# Patient Record
Sex: MALE | Race: OTHER | HISPANIC OR LATINO | URBAN - METROPOLITAN AREA
[De-identification: names, ages, dates, MRNs, and addresses within clinical notes are randomized per-mention and may not be internally consistent; named-entity substitution may affect disease eponyms.]

---

## 2017-11-04 ENCOUNTER — EMERGENCY (EMERGENCY)
Facility: HOSPITAL | Age: 22
LOS: 1 days | Discharge: HOME | End: 2017-11-04
Attending: EMERGENCY MEDICINE | Admitting: EMERGENCY MEDICINE
Payer: MEDICAID

## 2017-11-04 VITALS
HEART RATE: 86 BPM | OXYGEN SATURATION: 98 % | WEIGHT: 139.99 LBS | DIASTOLIC BLOOD PRESSURE: 80 MMHG | HEIGHT: 72 IN | RESPIRATION RATE: 20 BRPM | TEMPERATURE: 99 F | SYSTOLIC BLOOD PRESSURE: 138 MMHG

## 2017-11-04 DIAGNOSIS — R22.0 LOCALIZED SWELLING, MASS AND LUMP, HEAD: Chronic | ICD-10-CM

## 2017-11-04 PROCEDURE — 94640 AIRWAY INHALATION TREATMENT: CPT

## 2017-11-04 PROCEDURE — 99284 EMERGENCY DEPT VISIT MOD MDM: CPT | Mod: 25

## 2017-11-04 PROCEDURE — 71046 X-RAY EXAM CHEST 2 VIEWS: CPT

## 2017-11-04 PROCEDURE — 96374 THER/PROPH/DIAG INJ IV PUSH: CPT

## 2017-11-04 PROCEDURE — 99285 EMERGENCY DEPT VISIT HI MDM: CPT | Mod: 25

## 2017-11-04 PROCEDURE — 99053 MED SERV 10PM-8AM 24 HR FAC: CPT

## 2017-11-04 PROCEDURE — 71020: CPT | Mod: 26

## 2017-11-04 RX ORDER — BUDESONIDE AND FORMOTEROL FUMARATE DIHYDRATE 160; 4.5 UG/1; UG/1
2 AEROSOL RESPIRATORY (INHALATION)
Qty: 1 | Refills: 0 | OUTPATIENT
Start: 2017-11-04 | End: 2017-12-04

## 2017-11-04 RX ORDER — IPRATROPIUM/ALBUTEROL SULFATE 18-103MCG
3 AEROSOL WITH ADAPTER (GRAM) INHALATION ONCE
Qty: 0 | Refills: 0 | Status: COMPLETED | OUTPATIENT
Start: 2017-11-04 | End: 2017-11-04

## 2017-11-04 RX ORDER — ALBUTEROL 90 UG/1
2 AEROSOL, METERED ORAL
Qty: 1 | Refills: 0 | OUTPATIENT
Start: 2017-11-04 | End: 2017-12-04

## 2017-11-04 RX ADMIN — Medication 125 MILLIGRAM(S): at 04:18

## 2017-11-04 RX ADMIN — Medication 3 MILLILITER(S): at 04:05

## 2017-11-04 NOTE — ED PROVIDER NOTE - PROGRESS NOTE DETAILS
PEAK PRIOR TO TX: 125 PEAK AFTER TX: 375. cxr reviewed. pt improved. lungs CTABL. will rx prednisone, Symbicort, and albuterol inhaler. All questions answered and concerns addressed. pt verbalized understanding and agreement with plan and dx. pt advised to follow up with PMD. pt advised to return to ed for worsening symptoms including fever, cp, sob. will dc.

## 2017-11-04 NOTE — ED PROVIDER NOTE - OBJECTIVE STATEMENT
21 y/o M pt with PMHx of asthma presents to ED c/o difficulty breathing, nasal and chest congestion x1 hour. Pt reports he believes he is having a sudden onset asthma attack. He did not self medicate PTA because he does not have an inhaler. Pt notes that he was intubated for asthma 6 years ago. Pt denies fever, chills, CP, cough, sore throat, ear pain, headache, nausea, vomiting, and abdominal pain. No further complaints at this time. 23 y/o M pt with PMHx of asthma presents to ED c/o difficulty breathing, nasal and chest congestion x1 hour and productive cough x3 days. Pt reports he believes he is having a sudden onset asthma attack. He did not self medicate PTA because he does not have an inhaler. Pt notes that he was intubated for asthma 6 years ago. Pt denies fever, chills, CP, sore throat, ear pain, headache, nausea, vomiting, and abdominal pain. No further complaints at this time. 23 y/o M pt with PMHx of asthma (1 intubation 6 y ago) presents to ED c/o asthma exacerbation x tonight. pt endorses difficulty breathing, nasal and chest congestion x1 hour with productive cough x3 days. pt reports he ran out of his inhaler.  Pt denies rash, fever, chills, CP, sore throat, ear pain, headache, nausea, vomiting, and abdominal pain. nkda

## 2017-11-04 NOTE — ED PROVIDER NOTE - CHPI ED SYMPTOMS POS
NASAL CONGESTION/DIFFICULTY BREATHING/CHEST CONGESTION NASAL CONGESTION/CHEST CONGESTION/COUGH/DIFFICULTY BREATHING WHEEZING/DIFFICULTY BREATHING/NASAL CONGESTION/CHEST CONGESTION/COUGH

## 2017-11-04 NOTE — ED PROVIDER NOTE - ATTENDING CONTRIBUTION TO CARE
I personally saw the patient with the PA, and completed the key components of the history and physical exam. I then discussed the management plan with the PA    pt with wheezing, sob. will give nebs and steroids, dx: reactive airway disease

## 2017-11-04 NOTE — ED ADULT NURSE NOTE - CHPI ED SYMPTOMS NEG
no fever/no edema/no chest pain/no cough/no chills/no body aches/no headache/no hemoptysis/no diaphoresis

## 2017-11-04 NOTE — ED ADULT NURSE NOTE - OBJECTIVE STATEMENT
Pt. complaining of sob and difficulty breathing x 2 hours.  Pt. states he has a history of asthma and approx 2 hours ago he began having difficulty breathing; pt. states he has no inhalers at home.  Pt. denies chest pain.  Denies fever/chills.  Denies n/v/d.

## 2017-11-27 ENCOUNTER — EMERGENCY (EMERGENCY)
Facility: HOSPITAL | Age: 22
LOS: 1 days | Discharge: HOME | End: 2017-11-27
Attending: EMERGENCY MEDICINE
Payer: COMMERCIAL

## 2017-11-27 VITALS
DIASTOLIC BLOOD PRESSURE: 64 MMHG | TEMPERATURE: 100 F | RESPIRATION RATE: 18 BRPM | SYSTOLIC BLOOD PRESSURE: 112 MMHG | OXYGEN SATURATION: 97 % | HEART RATE: 90 BPM

## 2017-11-27 VITALS
HEIGHT: 76 IN | DIASTOLIC BLOOD PRESSURE: 86 MMHG | OXYGEN SATURATION: 98 % | RESPIRATION RATE: 18 BRPM | TEMPERATURE: 102 F | SYSTOLIC BLOOD PRESSURE: 119 MMHG | WEIGHT: 134.92 LBS | HEART RATE: 113 BPM

## 2017-11-27 DIAGNOSIS — R13.10 DYSPHAGIA, UNSPECIFIED: ICD-10-CM

## 2017-11-27 DIAGNOSIS — J36 PERITONSILLAR ABSCESS: ICD-10-CM

## 2017-11-27 DIAGNOSIS — Z79.899 OTHER LONG TERM (CURRENT) DRUG THERAPY: ICD-10-CM

## 2017-11-27 DIAGNOSIS — R22.0 LOCALIZED SWELLING, MASS AND LUMP, HEAD: Chronic | ICD-10-CM

## 2017-11-27 DIAGNOSIS — Z98.890 OTHER SPECIFIED POSTPROCEDURAL STATES: ICD-10-CM

## 2017-11-27 DIAGNOSIS — Z79.52 LONG TERM (CURRENT) USE OF SYSTEMIC STEROIDS: ICD-10-CM

## 2017-11-27 DIAGNOSIS — J45.909 UNSPECIFIED ASTHMA, UNCOMPLICATED: ICD-10-CM

## 2017-11-27 LAB
ALBUMIN SERPL ELPH-MCNC: 4.2 G/DL — SIGNIFICANT CHANGE UP (ref 3.3–5.2)
ALP SERPL-CCNC: 68 U/L — SIGNIFICANT CHANGE UP (ref 40–120)
ALT FLD-CCNC: 8 U/L — SIGNIFICANT CHANGE UP
ANION GAP SERPL CALC-SCNC: 16 MMOL/L — SIGNIFICANT CHANGE UP (ref 5–17)
AST SERPL-CCNC: 14 U/L — SIGNIFICANT CHANGE UP
BASOPHILS # BLD AUTO: 0.1 K/UL — SIGNIFICANT CHANGE UP (ref 0–0.2)
BILIRUB SERPL-MCNC: 0.9 MG/DL — SIGNIFICANT CHANGE UP (ref 0.4–2)
BUN SERPL-MCNC: 11 MG/DL — SIGNIFICANT CHANGE UP (ref 8–20)
CALCIUM SERPL-MCNC: 9.7 MG/DL — SIGNIFICANT CHANGE UP (ref 8.6–10.2)
CHLORIDE SERPL-SCNC: 99 MMOL/L — SIGNIFICANT CHANGE UP (ref 98–107)
CO2 SERPL-SCNC: 25 MMOL/L — SIGNIFICANT CHANGE UP (ref 22–29)
CREAT SERPL-MCNC: 0.92 MG/DL — SIGNIFICANT CHANGE UP (ref 0.5–1.3)
EOSINOPHIL # BLD AUTO: 0.1 K/UL — SIGNIFICANT CHANGE UP (ref 0–0.5)
GLUCOSE SERPL-MCNC: 93 MG/DL — SIGNIFICANT CHANGE UP (ref 70–115)
HCT VFR BLD CALC: 43.8 % — SIGNIFICANT CHANGE UP (ref 42–52)
HGB BLD-MCNC: 14.3 G/DL — SIGNIFICANT CHANGE UP (ref 14–18)
LYMPHOCYTES # BLD AUTO: 1.6 K/UL — SIGNIFICANT CHANGE UP (ref 1–4.8)
LYMPHOCYTES # BLD AUTO: 9 % — LOW (ref 20–55)
MCHC RBC-ENTMCNC: 28.4 PG — SIGNIFICANT CHANGE UP (ref 27–31)
MCHC RBC-ENTMCNC: 32.6 G/DL — SIGNIFICANT CHANGE UP (ref 32–36)
MCV RBC AUTO: 86.9 FL — SIGNIFICANT CHANGE UP (ref 80–94)
MONOCYTES # BLD AUTO: 1.4 K/UL — HIGH (ref 0–0.8)
MONOCYTES NFR BLD AUTO: 8 % — SIGNIFICANT CHANGE UP (ref 3–10)
NEUTROPHILS # BLD AUTO: 13.6 K/UL — HIGH (ref 1.8–8)
NEUTROPHILS NFR BLD AUTO: 81 % — HIGH (ref 37–73)
NEUTS BAND # BLD: 2 % — SIGNIFICANT CHANGE UP (ref 0–8)
PLAT MORPH BLD: NORMAL — SIGNIFICANT CHANGE UP
PLATELET # BLD AUTO: 288 K/UL — SIGNIFICANT CHANGE UP (ref 150–400)
POTASSIUM SERPL-MCNC: 3.8 MMOL/L — SIGNIFICANT CHANGE UP (ref 3.5–5.3)
POTASSIUM SERPL-SCNC: 3.8 MMOL/L — SIGNIFICANT CHANGE UP (ref 3.5–5.3)
PROT SERPL-MCNC: 8.2 G/DL — SIGNIFICANT CHANGE UP (ref 6.6–8.7)
RBC # BLD: 5.04 M/UL — SIGNIFICANT CHANGE UP (ref 4.6–6.2)
RBC # FLD: 12.2 % — SIGNIFICANT CHANGE UP (ref 11–15.6)
RBC BLD AUTO: SIGNIFICANT CHANGE UP
SODIUM SERPL-SCNC: 140 MMOL/L — SIGNIFICANT CHANGE UP (ref 135–145)
WBC # BLD: 16.7 K/UL — HIGH (ref 4.8–10.8)
WBC # FLD AUTO: 16.7 K/UL — HIGH (ref 4.8–10.8)

## 2017-11-27 PROCEDURE — 99284 EMERGENCY DEPT VISIT MOD MDM: CPT

## 2017-11-27 PROCEDURE — 42700 I&D ABSCESS PERITONSILLAR: CPT

## 2017-11-27 PROCEDURE — 99284 EMERGENCY DEPT VISIT MOD MDM: CPT | Mod: 25

## 2017-11-27 PROCEDURE — 80053 COMPREHEN METABOLIC PANEL: CPT

## 2017-11-27 PROCEDURE — T1013: CPT

## 2017-11-27 PROCEDURE — 96374 THER/PROPH/DIAG INJ IV PUSH: CPT | Mod: XU

## 2017-11-27 PROCEDURE — 36415 COLL VENOUS BLD VENIPUNCTURE: CPT

## 2017-11-27 PROCEDURE — 96375 TX/PRO/DX INJ NEW DRUG ADDON: CPT | Mod: XU

## 2017-11-27 PROCEDURE — 85027 COMPLETE CBC AUTOMATED: CPT

## 2017-11-27 RX ORDER — SODIUM CHLORIDE 9 MG/ML
2000 INJECTION INTRAMUSCULAR; INTRAVENOUS; SUBCUTANEOUS ONCE
Qty: 0 | Refills: 0 | Status: COMPLETED | OUTPATIENT
Start: 2017-11-27 | End: 2017-11-27

## 2017-11-27 RX ORDER — DEXAMETHASONE 0.5 MG/5ML
10 ELIXIR ORAL ONCE
Qty: 0 | Refills: 0 | Status: COMPLETED | OUTPATIENT
Start: 2017-11-27 | End: 2017-11-27

## 2017-11-27 RX ORDER — ACETAMINOPHEN 500 MG
650 TABLET ORAL ONCE
Qty: 0 | Refills: 0 | Status: COMPLETED | OUTPATIENT
Start: 2017-11-27 | End: 2017-11-27

## 2017-11-27 RX ORDER — PIPERACILLIN AND TAZOBACTAM 4; .5 G/20ML; G/20ML
3.38 INJECTION, POWDER, LYOPHILIZED, FOR SOLUTION INTRAVENOUS ONCE
Qty: 0 | Refills: 0 | Status: COMPLETED | OUTPATIENT
Start: 2017-11-27 | End: 2017-11-27

## 2017-11-27 RX ADMIN — SODIUM CHLORIDE 2000 MILLILITER(S): 9 INJECTION INTRAMUSCULAR; INTRAVENOUS; SUBCUTANEOUS at 12:57

## 2017-11-27 RX ADMIN — PIPERACILLIN AND TAZOBACTAM 200 GRAM(S): 4; .5 INJECTION, POWDER, LYOPHILIZED, FOR SOLUTION INTRAVENOUS at 12:56

## 2017-11-27 RX ADMIN — Medication 650 MILLIGRAM(S): at 12:55

## 2017-11-27 RX ADMIN — Medication 10 MILLIGRAM(S): at 12:56

## 2017-11-27 NOTE — ED PROVIDER NOTE - CHPI ED SYMPTOMS NEG
no headache/no vomiting/no rash/no decreased eating/drinking/no cough/no abdominal pain/no shortness of breath/no diarrhea

## 2017-11-27 NOTE — ED ADULT NURSE REASSESSMENT NOTE - NS ED NURSE REASSESS COMMENT FT1
pt with no complaints, AOX3, even and unlabored resps, awaiting ENT for consult. VSS, afebrile at this time, appears more comfortable, verbalized comfort at this time.

## 2017-11-27 NOTE — ED PROVIDER NOTE - OBJECTIVE STATEMENT
The patient presents with 2 days history of sore throat with difficult swallowing. No trismus, No torticollis +fever, +chill, No CP, No SOB The patient presents with 2 days history of sore throat with difficult swallowing. No trismus, No torticollis +fever, +chill, No CP, No SOB, No abd pain

## 2017-11-27 NOTE — ED PROVIDER NOTE - PROGRESS NOTE DETAILS
ENT On-call physician states that he will come at 7 pm to evaluate the patient. Case s/o to Dr Villanueva at 7 PM. Seen in ED by ENT/Dr. Jay and I and D performed and pt cleared for d/c.  Rx warm, salt water gargles, Augmentin and Medrol

## 2017-11-27 NOTE — ED ADULT NURSE REASSESSMENT NOTE - NS ED NURSE REASSESS COMMENT FT1
ENT here to I + D patients abscess. pt remains AOX3, in no apparent distress. even and unlabored resps.

## 2017-11-27 NOTE — ED PROVIDER NOTE - ENMT, MLM
Airway patent, Nasal mucosa clear. Mouth with normal mucosa. Throat has L tonsillar enlargement with fullness and fluctuant area palpable

## 2017-11-27 NOTE — CONSULT NOTE ADULT - SUBJECTIVE AND OBJECTIVE BOX
22y M 1 day history of left sore throat. Dysphagia. Left Otalgia.    PE: gen NAD  OC: Left peritonsillar fullness/edema. deviation of uvula. tonsillitis.  Neck supple, minimal LAD

## 2018-07-29 ENCOUNTER — EMERGENCY (EMERGENCY)
Facility: HOSPITAL | Age: 23
LOS: 1 days | Discharge: HOME | End: 2018-07-29
Attending: EMERGENCY MEDICINE
Payer: COMMERCIAL

## 2018-07-29 VITALS
TEMPERATURE: 99 F | RESPIRATION RATE: 20 BRPM | OXYGEN SATURATION: 97 % | HEART RATE: 86 BPM | SYSTOLIC BLOOD PRESSURE: 127 MMHG | DIASTOLIC BLOOD PRESSURE: 83 MMHG

## 2018-07-29 VITALS — WEIGHT: 145.06 LBS | HEIGHT: 72 IN

## 2018-07-29 DIAGNOSIS — R22.0 LOCALIZED SWELLING, MASS AND LUMP, HEAD: Chronic | ICD-10-CM

## 2018-07-29 PROBLEM — J45.909 UNSPECIFIED ASTHMA, UNCOMPLICATED: Chronic | Status: ACTIVE | Noted: 2017-11-04

## 2018-07-29 LAB
ANION GAP SERPL CALC-SCNC: 13 MMOL/L — SIGNIFICANT CHANGE UP (ref 5–17)
APTT BLD: 33.6 SEC — SIGNIFICANT CHANGE UP (ref 27.5–37.4)
BLD GP AB SCN SERPL QL: SIGNIFICANT CHANGE UP
BUN SERPL-MCNC: 17 MG/DL — SIGNIFICANT CHANGE UP (ref 8–20)
CALCIUM SERPL-MCNC: 9.6 MG/DL — SIGNIFICANT CHANGE UP (ref 8.6–10.2)
CHLORIDE SERPL-SCNC: 103 MMOL/L — SIGNIFICANT CHANGE UP (ref 98–107)
CO2 SERPL-SCNC: 26 MMOL/L — SIGNIFICANT CHANGE UP (ref 22–29)
CREAT SERPL-MCNC: 0.94 MG/DL — SIGNIFICANT CHANGE UP (ref 0.5–1.3)
GLUCOSE SERPL-MCNC: 84 MG/DL — SIGNIFICANT CHANGE UP (ref 70–115)
HCT VFR BLD CALC: 45.9 % — SIGNIFICANT CHANGE UP (ref 42–52)
HGB BLD-MCNC: 14.8 G/DL — SIGNIFICANT CHANGE UP (ref 14–18)
INR BLD: 1.13 RATIO — SIGNIFICANT CHANGE UP (ref 0.88–1.16)
MCHC RBC-ENTMCNC: 27.9 PG — SIGNIFICANT CHANGE UP (ref 27–31)
MCHC RBC-ENTMCNC: 32.2 G/DL — SIGNIFICANT CHANGE UP (ref 32–36)
MCV RBC AUTO: 86.6 FL — SIGNIFICANT CHANGE UP (ref 80–94)
PLATELET # BLD AUTO: 245 K/UL — SIGNIFICANT CHANGE UP (ref 150–400)
POTASSIUM SERPL-MCNC: 3.9 MMOL/L — SIGNIFICANT CHANGE UP (ref 3.5–5.3)
POTASSIUM SERPL-SCNC: 3.9 MMOL/L — SIGNIFICANT CHANGE UP (ref 3.5–5.3)
PROTHROM AB SERPL-ACNC: 12.5 SEC — SIGNIFICANT CHANGE UP (ref 9.8–12.7)
RBC # BLD: 5.3 M/UL — SIGNIFICANT CHANGE UP (ref 4.6–6.2)
RBC # FLD: 12.3 % — SIGNIFICANT CHANGE UP (ref 11–15.6)
S PYO AG SPEC QL IA: NEGATIVE — SIGNIFICANT CHANGE UP
SODIUM SERPL-SCNC: 142 MMOL/L — SIGNIFICANT CHANGE UP (ref 135–145)
TYPE + AB SCN PNL BLD: SIGNIFICANT CHANGE UP
WBC # BLD: 10.5 K/UL — SIGNIFICANT CHANGE UP (ref 4.8–10.8)
WBC # FLD AUTO: 10.5 K/UL — SIGNIFICANT CHANGE UP (ref 4.8–10.8)

## 2018-07-29 PROCEDURE — 70491 CT SOFT TISSUE NECK W/DYE: CPT

## 2018-07-29 PROCEDURE — 86850 RBC ANTIBODY SCREEN: CPT

## 2018-07-29 PROCEDURE — 96374 THER/PROPH/DIAG INJ IV PUSH: CPT | Mod: XU

## 2018-07-29 PROCEDURE — 70491 CT SOFT TISSUE NECK W/DYE: CPT | Mod: 26

## 2018-07-29 PROCEDURE — 85730 THROMBOPLASTIN TIME PARTIAL: CPT

## 2018-07-29 PROCEDURE — 87880 STREP A ASSAY W/OPTIC: CPT

## 2018-07-29 PROCEDURE — 86900 BLOOD TYPING SEROLOGIC ABO: CPT

## 2018-07-29 PROCEDURE — 87081 CULTURE SCREEN ONLY: CPT

## 2018-07-29 PROCEDURE — 36415 COLL VENOUS BLD VENIPUNCTURE: CPT

## 2018-07-29 PROCEDURE — 85027 COMPLETE CBC AUTOMATED: CPT

## 2018-07-29 PROCEDURE — 85610 PROTHROMBIN TIME: CPT

## 2018-07-29 PROCEDURE — 80048 BASIC METABOLIC PNL TOTAL CA: CPT

## 2018-07-29 PROCEDURE — 96375 TX/PRO/DX INJ NEW DRUG ADDON: CPT | Mod: XU

## 2018-07-29 PROCEDURE — 99284 EMERGENCY DEPT VISIT MOD MDM: CPT | Mod: 25

## 2018-07-29 PROCEDURE — 86901 BLOOD TYPING SEROLOGIC RH(D): CPT

## 2018-07-29 RX ORDER — AZITHROMYCIN 500 MG/1
1 TABLET, FILM COATED ORAL
Qty: 7 | Refills: 0 | OUTPATIENT
Start: 2018-07-29 | End: 2018-08-04

## 2018-07-29 RX ORDER — DEXAMETHASONE 0.5 MG/5ML
10 ELIXIR ORAL ONCE
Qty: 0 | Refills: 0 | Status: COMPLETED | OUTPATIENT
Start: 2018-07-29 | End: 2018-07-29

## 2018-07-29 RX ORDER — AZITHROMYCIN 500 MG/1
500 TABLET, FILM COATED ORAL ONCE
Qty: 0 | Refills: 0 | Status: COMPLETED | OUTPATIENT
Start: 2018-07-29 | End: 2018-07-29

## 2018-07-29 RX ORDER — SODIUM CHLORIDE 9 MG/ML
3 INJECTION INTRAMUSCULAR; INTRAVENOUS; SUBCUTANEOUS ONCE
Qty: 0 | Refills: 0 | Status: COMPLETED | OUTPATIENT
Start: 2018-07-29 | End: 2018-07-29

## 2018-07-29 RX ADMIN — SODIUM CHLORIDE 3 MILLILITER(S): 9 INJECTION INTRAMUSCULAR; INTRAVENOUS; SUBCUTANEOUS at 10:54

## 2018-07-29 RX ADMIN — Medication 102 MILLIGRAM(S): at 11:25

## 2018-07-29 RX ADMIN — AZITHROMYCIN 255 MILLIGRAM(S): 500 TABLET, FILM COATED ORAL at 15:19

## 2018-07-29 NOTE — ED PROVIDER NOTE - MEDICAL DECISION MAKING DETAILS
24 y/o male c/o right ear pain and throat pain since last night. Recurrent issue. 3+ throat swelling on exam concerning for tonsillitis. Will do rapid strep and start on abx. 22 y/o male c/o right ear pain and throat pain since last night. Recurrent issue. 3+ throat swelling on exam concerning for tonsillitis. Will do rapid strep and start on abx.  NEG STREP AND NEG CT  IV MEDS AND DC

## 2018-07-29 NOTE — ED PROVIDER NOTE - OBJECTIVE STATEMENT
1 day of right ear pain and throat pain/swelling, and pain with swallowing. Feels warm, no chills. Denies sinus pain/congestion/drainage, eye pain, headache. States he has recurrent tonsil infections and this feels the same. Last episode was treated with augmetin approx 4 months ago. Denies nausea/vomiting, sick contacts, or travel.

## 2018-07-29 NOTE — ED ADULT NURSE NOTE - OBJECTIVE STATEMENT
Pt A&XO3, c/o throat, and ear pain since yesterday, pt also c/o discomfort when swallowing.  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  No fevers.

## 2018-07-29 NOTE — ED ADULT TRIAGE NOTE - CHIEF COMPLAINT QUOTE
patient states that he has right ear pain sore throat with difficulty swallowing and fever started yesterday

## 2018-07-31 LAB
CULTURE RESULTS: SIGNIFICANT CHANGE UP
SPECIMEN SOURCE: SIGNIFICANT CHANGE UP

## 2020-02-09 ENCOUNTER — EMERGENCY (EMERGENCY)
Facility: HOSPITAL | Age: 25
LOS: 1 days | Discharge: HOME | End: 2020-02-09
Attending: EMERGENCY MEDICINE
Payer: COMMERCIAL

## 2020-02-09 VITALS
RESPIRATION RATE: 16 BRPM | DIASTOLIC BLOOD PRESSURE: 90 MMHG | WEIGHT: 149.91 LBS | OXYGEN SATURATION: 98 % | HEART RATE: 74 BPM | SYSTOLIC BLOOD PRESSURE: 144 MMHG | TEMPERATURE: 98 F | HEIGHT: 67 IN

## 2020-02-09 DIAGNOSIS — R22.0 LOCALIZED SWELLING, MASS AND LUMP, HEAD: Chronic | ICD-10-CM

## 2020-02-09 PROCEDURE — 99284 EMERGENCY DEPT VISIT MOD MDM: CPT

## 2020-02-09 PROCEDURE — 99284 EMERGENCY DEPT VISIT MOD MDM: CPT | Mod: 25

## 2020-02-09 PROCEDURE — 72125 CT NECK SPINE W/O DYE: CPT | Mod: 26

## 2020-02-09 PROCEDURE — 73110 X-RAY EXAM OF WRIST: CPT | Mod: 26,LT

## 2020-02-09 PROCEDURE — 73110 X-RAY EXAM OF WRIST: CPT

## 2020-02-09 PROCEDURE — 72125 CT NECK SPINE W/O DYE: CPT

## 2020-02-09 RX ORDER — IBUPROFEN 200 MG
400 TABLET ORAL ONCE
Refills: 0 | Status: COMPLETED | OUTPATIENT
Start: 2020-02-09 | End: 2020-02-09

## 2020-02-09 RX ORDER — ACETAMINOPHEN 500 MG
975 TABLET ORAL ONCE
Refills: 0 | Status: COMPLETED | OUTPATIENT
Start: 2020-02-09 | End: 2020-02-09

## 2020-02-09 RX ORDER — METHOCARBAMOL 500 MG/1
1500 TABLET, FILM COATED ORAL ONCE
Refills: 0 | Status: COMPLETED | OUTPATIENT
Start: 2020-02-09 | End: 2020-02-09

## 2020-02-09 RX ORDER — LIDOCAINE 4 G/100G
2 CREAM TOPICAL ONCE
Refills: 0 | Status: COMPLETED | OUTPATIENT
Start: 2020-02-09 | End: 2020-02-09

## 2020-02-09 RX ORDER — LIDOCAINE 4 G/100G
1 CREAM TOPICAL
Qty: 4 | Refills: 0
Start: 2020-02-09 | End: 2020-02-12

## 2020-02-09 RX ORDER — IBUPROFEN 200 MG
1 TABLET ORAL
Qty: 20 | Refills: 0
Start: 2020-02-09 | End: 2020-02-13

## 2020-02-09 RX ADMIN — Medication 400 MILLIGRAM(S): at 20:13

## 2020-02-09 RX ADMIN — METHOCARBAMOL 1500 MILLIGRAM(S): 500 TABLET, FILM COATED ORAL at 21:34

## 2020-02-09 RX ADMIN — Medication 975 MILLIGRAM(S): at 21:34

## 2020-02-09 RX ADMIN — LIDOCAINE 2 PATCH: 4 CREAM TOPICAL at 21:34

## 2020-02-09 NOTE — ED STATDOCS - ADDITIONAL NOTES AND INSTRUCTIONS:
Cardiac Cath Lab Recovery Arrival Note:      Maren Singh arrived to Cardiac Cath Lab, Recovery Area. Staff introduced to patient. Patient identifiers verified with NAME and DATE OF BIRTH. Procedure verified with patient. Consent forms reviewed and signed by patient or authorized representative and verified. Allergies verified. Patient and family oriented to department. Patient and family informed of procedure and plan of care. Questions answered with review. Patient prepped for procedure, per orders from physician, prior to arrival.    Patient on cardiac monitor, non-invasive blood pressure, SPO2 monitor. On room air. Patient is A&Ox 3. Patient reports no complaints. Patient in stretcher, in low position, with side rails up, call bell within reach, patient instructed to call if assistance as needed. Patient prep in: [address], [place] [address]. Family in: waiting area.    Prep by: Brenda Weiner PT was evaluated At [facility] ED and was found to have a condition that warranted time of to rest and heal from WORK/SCHOOL.   Jin Jackman PA-C
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
## 2020-02-09 NOTE — ED STATDOCS - PHYSICAL EXAMINATION
A: Airway intact   B: BS b/l   C: peripheral pulses intact,   D: moving all extremities, GCS 15  Head: NCAT   HEENT: PERRL, EOMI, trachea midline   Chest: nontender, no deformities   Abd: soft, NTND, no seatbelt sign  MSK: +midline cervical ttp, + pain with flexion rt hip but FROM hip and no reproducible palpable pain to rt leg, +ttp left wrist with mild overlying erythema, pelvis stable, no midline T/L ttp   Skin: no wounds
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
## 2020-02-09 NOTE — ED STATDOCS - NS ED ATTENDING STATEMENT MOD
I have personally performed a face to face diagnostic evaluation on this patient. I have reviewed the ACP note and agree with the history, exam and plan of care, except as noted. [facility]

## 2020-02-09 NOTE — ED STATDOCS - PATIENT PORTAL LINK FT
You can access the FollowMyHealth Patient Portal offered by [facility] by registering at the following website: [email or web address]. By joining [employer]’s FollowMyHealth portal, you will also be able to view your health information using other applications (apps) compatible with our system.

## 2020-02-09 NOTE — ED STATDOCS - NSFOLLOWUPINSTRUCTIONS_ED_ALL_ED_FT
Patient education: Low back pain in adults (The Basics)  View in Swedish  Written by the doctors and editors at [facility]  How worried should I be about low back pain?  Do not assume the worst. Almost everyone gets back pain at some point. Low back pain can be scary. But even when the pain is severe, it usually goes away on its own within a few weeks. The cases that require urgent care or surgery are rare.    See your doctor or nurse if you have back pain and you:    ?Recently had a fall or an injury to your back    ?Have numbness or weakness in your legs    ?Have problems with bladder or bowel control    ?Have unexplained weight loss    ?Have a fever or feel sick in other ways    ?Take steroid medicine, such as prednisone, on a regular basis    ?Have diabetes or a medical problem that weakens your immune system    ?Have a history of cancer or osteoporosis    You should also see a doctor if:    ?Your back pain is so severe that you cannot perform simple tasks    ?Your back pain does not start to improve within 4 weeks    What are the parts of the back?  The back is made up of (figure 1):    ?Vertebrae – A stack of bones that sit on top of one another like a stack of coins. Each of these bones has a hole in the center. When stacked, the bones form a hollow tube that protects the spinal cord.    ?Discs – Rubbery discs sit in between each of the vertebrae to add cushion and allow movement.    ?Spinal cord and nerves – The spinal cord is the highway of nerves that connects the brain to the rest of the body. It runs through the vertebrae within the spinal canal. Nerves branch from the spinal cord and pass in between the vertebrae. From there they connect to the arms, the legs, and the organs. (This is why problems in the back can cause leg pain or bladder or bowel problems.)    ?Muscles, tendons, and ligaments – Together the muscles, tendons, and ligaments are called the "soft tissues" of the back. These soft tissues support the back and help hold it together.    What causes low back pain?  Many different things can cause low back pain. Most of the time, doctors do not know the exact cause.    Back pain can happen if you strain a muscle. This is often what has happened when a person "throws out" their back. This refers to pain that starts suddenly after physical activity, like lifting something heavy or bending the back.    Back pain can also happen if you have:    ?Damaged, bulging, or torn discs    ?Arthritis affecting the joints of the spine    ?Bony growths on the vertebrae that crowd nearby nerves    ?A vertebra out of place    ?Narrowing in the spinal canal    ?A tumor or infection (but this is very rare)    Should I get an imaging test?  Most people do not need an imaging test such an X-ray, CT scan, or MRI. Most cases of back pain go away a few weeks. Doctors usually do not order imaging tests unless there are signs of something unusual.    If your doctor does not order an imaging test, do not worry. They can still learn a lot about your pain just from looking you over and talking with you.    How can the doctor or nurse tell what is wrong just by talking to me?  Your symptoms tell your doctor or nurse a lot about the cause of your pain. For example:    ?If your pain started after you did something specific, like lifting a heavy object or twisting your back, you might have strained a muscle.    ?If your pain spreads down the back of one thigh, it could be a sign that one of the nerves that go to your leg is being pinched by a bulging or torn disc.    ?If your pain goes all the way down both legs, it could be a sign that you have a narrowed spinal canal. This is most often due to bony growths on your spine.    How is back pain treated?  Most people with an episode of low back pain do not have a serious medical problem, and can try simple treatments such as:    ?Staying active – The best thing you can do is to stay as active as possible. People with low back pain recover faster if they stay active. If your pain is severe, you might need to rest for a day or 2. But it's important to get back to walking and moving as soon as possible. While you should avoid heavy lifting and sports while your back hurts, try to keep doing your normal daily activities.    ?Heat – Some people find that it helps to use a heating pad or heated wrap. Be careful to avoid high heat settings to prevent skin burns.    ?Medicines – First, you can try pain medicines that you can get without a prescription. In many cases, doctors suggest first trying a nonsteroidal antiinflammatory drug, or "NSAID." NSAIDs include ibuprofen (sample brand names: Advil, Motrin) and naproxen (sample brand name: Aleve). These might work better than acetaminophen (Tylenol) for back pain.    If non-prescription medicines do not help, let your doctor or nurse know. In some cases, doctors prescribe a medicine to relax the muscles (called a "muscle relaxant"). But keep in mind that muscle relaxants are not generally used in people older than 65. In older people, these medicines can cause side effects such as trouble urinating or confusion.    ?Treatments to help with symptoms – Some treatments might help you feel better for a little while. They include:    •Spinal manipulation – This is when a chiropractor, physical therapist, or other professional moves or "adjusts" the joints of your back. If you want to try this, talk to your doctor or nurse first.    •Acupuncture – This is when someone who knows traditional Chinese medicine inserts tiny needles into your body to block pain signals.    •Massage    While back pain usually goes away within a few weeks, some people do continue to have pain for longer. In this case, additional treatments might include:    ?Self care – This involves being aware of your pain. While you should rest when you need to, it's important to stay active as much as you can. Things like applying heat and doing gentle stretches can help you feel better, too.    ?Physical therapy – A physical therapist is an exercise expert who can teach you stretches and movements to help strengthen your muscles. The goal is to relieve pain but also help you get back to your normal activities.    Exercises you can try include walking, swimming, or using an exercise bike. Some people also find that Patrick Chi or yoga can help with their back pain. Finding activities you enjoy can help you stay active.    ?Reducing stress – Some people find that it helps to try something called "mindfulness-based stress reduction." This involves going to a group program to practice relaxation and meditation. If your back pain is making you feel anxious or depressed, talk to your doctor or nurse. There are other treatments that can help with these problems.    Some people wonder if injections (shots) can help to relieve back pain. In some cases, doctors might recommend a shot of medicine to numb the area or reduce swelling. But this has only been proven to work in specific situations.    Only a small number of people end up needing surgery to treat back pain.    What can I do to keep from getting back pain again?  The best thing you can do is to stay active. Doing exercises to strengthen and stretch your back can help. You can also:    ?Learn to lift using your legs instead of your back    ?Avoid sitting or standing in the same position for too long    Having back pain can be frustrating and scary. But it can help to know that doing these things can lower your risk of having another episode.    Patient education: Do I need an X-ray (or other test) for low back pain? (The Basics)  View in Swedish  Written by the doctors and editors at [facility]  What do imaging tests do?  Imaging tests, such as X-rays, create pictures of the inside of the body. Some do this in more detail than others. In the case of low back pain, doctors do imaging tests to try to see the structures inside the back (figure 1).    The most commonly used imaging tests are:    ?X-rays – X-rays are good at showing bones and large internal structures. But they are not good at showing problems with "soft tissues." (Soft tissues include muscles and the rubbery discs found between each of the bones in the spine.) An X-ray can show if you have bones that are broken or out of place, or certain types of tumors or infections. X-rays might be done if back pain is caused by a serious injury, such as a bad fall.    ?CT scans – CT scans are a special kind of X-ray. They show more detail than X-rays, but also expose you to more radiation and cost more. CT scans can show all the problems that X-rays show, plus some problems with soft tissues.    ?MRIs – MRIs are created using powerful magnets. They show more detail about soft tissues than CT scans, and they do not involve radiation. Some people can't have MRIs because they have devices implanted in their body that would be affected by the magnet. Plus, MRIs are expensive and people often have to wait to get them.    Do I need imaging if I have low back pain?  Probably not.    When people have severe back pain, they often jump to conclusions and assume something is terribly wrong with their back. The truth is, most cases of back pain – even severe pain – are not caused by anything serious. Low back pain usually goes away on its own or with simple treatment.    If you see the doctor or nurse because of low back pain, do not ask for or expect to get an imaging test right away, unless you have one of the symptoms described below. Most people do not need an imaging test in the first 4 to 6 weeks of low back pain. In most cases, it does not make sense to order the test sooner, because the treatment for most causes of low back pain during those first few weeks is the same no matter what an imaging test might show. Even without an imaging test, your doctor or nurse can learn a lot about your pain by talking with you and doing an exam.    How can the doctor or nurse tell what is wrong without an imaging test?  If you have low back pain, your doctor or nurse will do an exam and ask questions, such as:    ?Do you have pain in just your back, or does it spread to your buttocks or down your leg?    ?Is the pain the same on both sides, or does it affect one side more than the other?    ?Do you have numbness, tingling, or weakness anywhere?    ?Does the pain get better when you lean forward?    The results of the exam and your answers to these and other questions will give your doctor or nurse a good idea of what is going on with your back. If you have certain symptoms (discussed below), the doctor or nurse will know that there might be something serious going on and order an imaging test.    Who should have imaging tests?  People who have had pain for 4 to 6 weeks or longer often should have imaging tests to search for the cause. For people with certain symptoms, the doctor or nurse might want to order imaging tests right away. This includes people who:    ?Had a serious accident or injury recently (such as a car crash or a bad fall)    ?Are older    ?Have back pain along with unexplained fever or weight loss    ?Take medicines called "immune suppressants" or medicines called "steroids"    ?Have diabetes    ?Have a history of cancer (except for skin cancer that isn't melanoma)    ?Use drugs that you inject, such as heroin    ?Have osteoporosis, a condition that weakens bones    ?Have leg weakness or problems controlling their bowels or bladder    ?Have a problem called "foot drop," which is when you cannot seem to hold your foot up, for example, while walking    Why not have an imaging test just to check?  People tend to think of imaging tests as harmless, but they are not harmless. The X-rays doctors use to try to diagnose back pain give as much radiation to your pelvic organs (ovaries or testicles) as you would get from having a chest X-ray every day for more than a year. CT scans expose you to even more radiation.    MRIs do not expose you to radiation but they still have risks. People who have MRIs (or other imaging tests) are much more likely to have surgery and other invasive treatments than people who do not have imaging tests. That's true even for people who do not need surgery. That's because "abnormal" findings on imaging tests of the back are very common, even in people without back pain. Abnormal findings don't always mean that treatment is needed.    If your doctor or nurse does not think you need an imaging test for low back pain, trust him or her, or ask why. Back pain can be scary but doctors and nurses see many people with symptoms like yours who improve with time and do not need an imaging test.

## 2020-02-09 NOTE — ED STATDOCS - NS ED ROS FT
ROS: no CP/SOB. no cough. no fever. no n/v/d/c. no abd pain. no rash. no bleeding. no urinary complaints. no weakness. no vision changes. no HA. +neck pain. no extremity swelling/deformity. No change in mental status.

## 2020-02-09 NOTE — ED STATDOCS - ATTENDING CONTRIBUTION TO CARE
I, Francine Galvan, performed a face to face bedside interview with this patient regarding history of present illness, review of symptoms and relevant past medical, social and family history.  I completed an independent physical examination. Medical decision making, follow-up on ordered tests (ie labs, radiologic studies) and re-evaluation of the patient's status has been communicated to the ACP.  Disposition of the patient will be based on test outcome and response to ED interventions.

## 2020-02-09 NOTE — ED STATDOCS - NSFOLLOWUPCLINICS_GEN_ALL_ED_FT
[facility] - [facility]  Ortho/Spine  [address] [place] [id number]  Phone: [phone]  Fax:   Follow Up Time:

## 2021-04-13 ENCOUNTER — EMERGENCY (EMERGENCY)
Facility: HOSPITAL | Age: 26
LOS: 1 days | Discharge: HOME | End: 2021-04-13
Attending: STUDENT IN AN ORGANIZED HEALTH CARE EDUCATION/TRAINING PROGRAM
Payer: SELF-PAY

## 2021-04-13 VITALS
HEIGHT: 71 IN | SYSTOLIC BLOOD PRESSURE: 128 MMHG | TEMPERATURE: 98 F | DIASTOLIC BLOOD PRESSURE: 80 MMHG | OXYGEN SATURATION: 97 % | WEIGHT: 139.99 LBS | HEART RATE: 82 BPM | RESPIRATION RATE: 16 BRPM

## 2021-04-13 DIAGNOSIS — R22.0 LOCALIZED SWELLING, MASS AND LUMP, HEAD: Chronic | ICD-10-CM

## 2021-04-13 LAB
ALBUMIN SERPL ELPH-MCNC: 4.3 G/DL — SIGNIFICANT CHANGE UP (ref 3.3–5.2)
ALP SERPL-CCNC: 96 U/L — SIGNIFICANT CHANGE UP (ref 40–120)
ALT FLD-CCNC: 22 U/L — SIGNIFICANT CHANGE UP
ANION GAP SERPL CALC-SCNC: 10 MMOL/L — SIGNIFICANT CHANGE UP (ref 5–17)
AST SERPL-CCNC: 24 U/L — SIGNIFICANT CHANGE UP
BASOPHILS # BLD AUTO: 0.05 K/UL — SIGNIFICANT CHANGE UP (ref 0–0.2)
BASOPHILS NFR BLD AUTO: 0.7 % — SIGNIFICANT CHANGE UP (ref 0–2)
BILIRUB SERPL-MCNC: 0.8 MG/DL — SIGNIFICANT CHANGE UP (ref 0.4–2)
BUN SERPL-MCNC: 15 MG/DL — SIGNIFICANT CHANGE UP (ref 8–20)
CALCIUM SERPL-MCNC: 9 MG/DL — SIGNIFICANT CHANGE UP (ref 8.6–10.2)
CHLORIDE SERPL-SCNC: 104 MMOL/L — SIGNIFICANT CHANGE UP (ref 98–107)
CO2 SERPL-SCNC: 27 MMOL/L — SIGNIFICANT CHANGE UP (ref 22–29)
CREAT SERPL-MCNC: 0.85 MG/DL — SIGNIFICANT CHANGE UP (ref 0.5–1.3)
EOSINOPHIL # BLD AUTO: 0.56 K/UL — HIGH (ref 0–0.5)
EOSINOPHIL NFR BLD AUTO: 7.7 % — HIGH (ref 0–6)
GLUCOSE SERPL-MCNC: 90 MG/DL — SIGNIFICANT CHANGE UP (ref 70–99)
HCT VFR BLD CALC: 42.9 % — SIGNIFICANT CHANGE UP (ref 39–50)
HGB BLD-MCNC: 13.6 G/DL — SIGNIFICANT CHANGE UP (ref 13–17)
IMM GRANULOCYTES NFR BLD AUTO: 0.6 % — SIGNIFICANT CHANGE UP (ref 0–1.5)
LIDOCAIN IGE QN: 23 U/L — SIGNIFICANT CHANGE UP (ref 22–51)
LYMPHOCYTES # BLD AUTO: 1.14 K/UL — SIGNIFICANT CHANGE UP (ref 1–3.3)
LYMPHOCYTES # BLD AUTO: 15.8 % — SIGNIFICANT CHANGE UP (ref 13–44)
MCHC RBC-ENTMCNC: 28.2 PG — SIGNIFICANT CHANGE UP (ref 27–34)
MCHC RBC-ENTMCNC: 31.7 GM/DL — LOW (ref 32–36)
MCV RBC AUTO: 88.8 FL — SIGNIFICANT CHANGE UP (ref 80–100)
MONOCYTES # BLD AUTO: 0.45 K/UL — SIGNIFICANT CHANGE UP (ref 0–0.9)
MONOCYTES NFR BLD AUTO: 6.2 % — SIGNIFICANT CHANGE UP (ref 2–14)
NEUTROPHILS # BLD AUTO: 4.99 K/UL — SIGNIFICANT CHANGE UP (ref 1.8–7.4)
NEUTROPHILS NFR BLD AUTO: 69 % — SIGNIFICANT CHANGE UP (ref 43–77)
PLATELET # BLD AUTO: 227 K/UL — SIGNIFICANT CHANGE UP (ref 150–400)
POTASSIUM SERPL-MCNC: 4 MMOL/L — SIGNIFICANT CHANGE UP (ref 3.5–5.3)
POTASSIUM SERPL-SCNC: 4 MMOL/L — SIGNIFICANT CHANGE UP (ref 3.5–5.3)
PROT SERPL-MCNC: 7 G/DL — SIGNIFICANT CHANGE UP (ref 6.6–8.7)
RBC # BLD: 4.83 M/UL — SIGNIFICANT CHANGE UP (ref 4.2–5.8)
RBC # FLD: 11.9 % — SIGNIFICANT CHANGE UP (ref 10.3–14.5)
SODIUM SERPL-SCNC: 141 MMOL/L — SIGNIFICANT CHANGE UP (ref 135–145)
WBC # BLD: 7.23 K/UL — SIGNIFICANT CHANGE UP (ref 3.8–10.5)
WBC # FLD AUTO: 7.23 K/UL — SIGNIFICANT CHANGE UP (ref 3.8–10.5)

## 2021-04-13 PROCEDURE — 36415 COLL VENOUS BLD VENIPUNCTURE: CPT

## 2021-04-13 PROCEDURE — 99284 EMERGENCY DEPT VISIT MOD MDM: CPT

## 2021-04-13 PROCEDURE — 99283 EMERGENCY DEPT VISIT LOW MDM: CPT

## 2021-04-13 PROCEDURE — 80053 COMPREHEN METABOLIC PANEL: CPT

## 2021-04-13 PROCEDURE — 99053 MED SERV 10PM-8AM 24 HR FAC: CPT

## 2021-04-13 PROCEDURE — 85025 COMPLETE CBC W/AUTO DIFF WBC: CPT

## 2021-04-13 PROCEDURE — 83690 ASSAY OF LIPASE: CPT

## 2021-04-13 RX ORDER — SODIUM CHLORIDE 9 MG/ML
1000 INJECTION INTRAMUSCULAR; INTRAVENOUS; SUBCUTANEOUS ONCE
Refills: 0 | Status: COMPLETED | OUTPATIENT
Start: 2021-04-13 | End: 2021-04-13

## 2021-04-13 RX ADMIN — SODIUM CHLORIDE 1000 MILLILITER(S): 9 INJECTION INTRAMUSCULAR; INTRAVENOUS; SUBCUTANEOUS at 08:05

## 2021-04-13 NOTE — ED STATDOCS - PATIENT PORTAL LINK FT
You can access the FollowMyHealth Patient Portal offered by [facility] by registering at the following website: [email or web address]. By joining [facility]’s FollowMyHealth portal, you will also be able to view your health information using other applications (apps) compatible with our system.

## 2021-04-13 NOTE — ED STATDOCS - NSFOLLOWUPINSTRUCTIONS_ED_ALL_ED_FT
- Follow up with your doctor within 2-3 days.   - Return to the ED for any new or worsening symptoms.   - Drink plenty of fluids including water, gatorade, pedialyte    Diarrhea    Diarrhea is frequent loose or watery bowel movements that has many causes. Diarrhea can make you feel weak and cause you to become dehydrated. Diarrhea typically lasts 2–3 days, but can last longer if it is a sign of something more serious. Drink clear fluids to prevent dehydration. Eat bland, easy-to-digest foods as tolerated.     SEEK IMMEDIATE MEDICAL CARE IF YOU HAVE ANY OF THE FOLLOWING SYMPTOMS: high fevers, lightheadedness/dizziness, chest pain, black or bloody stools, shortness of breath, severe abdominal or back pain, or any signs of dehydration.

## 2021-04-13 NOTE — ED STATDOCS - PROGRESS NOTE DETAILS
PA note: PT evaluated by intake physician. HPI/PE/ROS as noted above. Will follow up plan per intake physician PA note: Labs unremarkable, not warranting emergent intervention. Results d/w pt, encouraged to hydrate with pedialyte, water, gatorade. return precautions discussed, stable for dc

## 2021-04-13 NOTE — ED STATDOCS - CLINICAL SUMMARY MEDICAL DECISION MAKING FREE TEXT BOX
25 y/o male with no PMHx presents to ED c/o abdominal pain. 27 y/o male with no PMHx presents to ED c/o abdominal pain, labs, IVF, reassess

## 2021-04-13 NOTE — ED ADULT NURSE NOTE - CHIEF COMPLAINT QUOTE
patient c/o lower abdominal pain since yesterday with diarrhea, denies fever chills, Nausea and vomiting.

## 2021-04-13 NOTE — ED STATDOCS - OBJECTIVE STATEMENT
25 y/o male with no PMHx presents to ED c/o abdominal pain. Patient reports 3 days of diarrhea. Was in [place] and may have eaten something there that made him have diarrhea. Has had persistent NB diarrhea for the past 2 days.     Pt denies fevers/chills, ha, loc, focal neuro deficits, cp/sob/palp, cough, abd pain/n/v, urinary symptoms, recent travel and sick contacts.

## 2022-07-29 NOTE — ED ADULT NURSE NOTE - OBJECTIVE STATEMENT
New glasses Rx given. Patient understands not a significant change in Rx this year and vision changes are due to cataracts progressing. Pt c/o lower abdominal pain, diarrhea x's 1 day

## 2022-08-31 ENCOUNTER — EMERGENCY (EMERGENCY)
Facility: HOSPITAL | Age: 27
LOS: 1 days | Discharge: HOME | End: 2022-08-31
Attending: EMERGENCY MEDICINE
Payer: COMMERCIAL

## 2022-08-31 VITALS
SYSTOLIC BLOOD PRESSURE: 146 MMHG | HEART RATE: 77 BPM | TEMPERATURE: 99 F | OXYGEN SATURATION: 97 % | DIASTOLIC BLOOD PRESSURE: 80 MMHG | WEIGHT: 139.99 LBS | HEIGHT: 71 IN | RESPIRATION RATE: 18 BRPM

## 2022-08-31 DIAGNOSIS — R22.0 LOCALIZED SWELLING, MASS AND LUMP, HEAD: Chronic | ICD-10-CM

## 2022-08-31 PROCEDURE — 99284 EMERGENCY DEPT VISIT MOD MDM: CPT

## 2022-08-31 PROCEDURE — 93005 ELECTROCARDIOGRAM TRACING: CPT

## 2022-08-31 PROCEDURE — 93010 ELECTROCARDIOGRAM REPORT: CPT

## 2022-08-31 NOTE — ED PROVIDER NOTE - LANGUAGE ASSISTANCE NEEDED
S/w Mom (Guardian) scheduled AWV for 2/7/2022 @ 9am w/ PCP  Mom requested I reach out to he [facility] ([address] [place] [address])  to confirm date and time works for [employer]  Mom will be accompanying them for this visit  I also reached out to [address] [facility] and spoke with Martha and this date also works for her  Yes-Patient/Caregiver accepts free interpretation services...

## 2022-08-31 NOTE — ED ADULT NURSE NOTE - OBJECTIVE STATEMENT
Patient is a 27 year old male who presented to the ED after taking 2 THC edibles, 500 mg each, and drinking 3 beers. He then complained of nausea and palpitations.

## 2022-08-31 NOTE — ED PROVIDER NOTE - NSFOLLOWUPINSTRUCTIONS_ED_ALL_ED_FT
Avoid taking any illicit substances  Follow up with your doctor as needed   Return sooner for any problems    Substance Abuse    Chemical dependency is an addiction to drugs or alcohol. It is characterized by the repeated behavior of seeking out and using drugs and alcohol despite harmful consequences to the health and safety of oneself and others. Using drugs in a manner that brought you to an Emergency Room suggests you may have an drug abuse problem. Seek help at a drug addiction center.    SEEK IMMEDIATE MEDICAL CARE IF YOU HAVE ANY OF THE FOLLOWING SYMPTOMS: chest pain, shortness of breath, change in mental status, thoughts about hurting killing yourself, thoughts about hurting or killing somebody else, hallucinations, or worsening depression.

## 2022-08-31 NOTE — ED PROVIDER NOTE - CLINICAL SUMMARY MEDICAL DECISION MAKING FREE TEXT BOX
Patient with palpitations after taking 2 edible THC and drinking 2-3 beers.  VSS.  Exam as above.  Non toxic.  Well appearing. Uneventful ED observation period.  Patient feels markedly improved on reassessment but is asking for more observation time.  Patient signed out to incoming physician.  All decisions regarding the progression of care will be made at their discretion.

## 2022-08-31 NOTE — ED ADULT TRIAGE NOTE - CHIEF COMPLAINT QUOTE
patient BIBA from home after taking 2 THC edibles, 500 mg each, and drinking 3 beers. now c/o nausea and palpitations. ambulatory in triage. changed into yellow gown and belongings secured.

## 2022-08-31 NOTE — ED PROVIDER NOTE - OBJECTIVE STATEMENT
Pertinent PMH/PSH/FHx/SHx and Review of Systems contained within:  Patient presents to the ED for palpitations after taking edibles and drinking 2-3 beers.  VSS.  no PMH.  Patient speaks English.   present for mother (with patient permission).  no trauma.  otherwise baseline.  first time taking THC edibles and "feels like I'm freaking out a little."  Non toxic.  Well appearing. No aggravating or relieving factors. No other pertinent PMH.  No other pertinent PSH.  No other pertinent FHx.  Patient denies EtOH/tobacco/illicit substance use. No fever/chills, No photophobia/eye pain/changes in vision, No ear pain/sore throat/dysphagia, No chest pain/palpitations, no SOB/cough/wheeze/stridor, No abdominal pain, No N/V/D, no dysuria/frequency/discharge, No neck/back pain, no rash, no changes in neurological status/function.

## 2022-08-31 NOTE — ED PROVIDER NOTE - PROGRESS NOTE DETAILS
Pt observed in ED.  Awake and alert at this time and feels well for d/c no abdominal pain, no bloating, no constipation, no diarrhea, no nausea and no vomiting.

## 2022-08-31 NOTE — ED PROVIDER NOTE - PATIENT PORTAL LINK FT
You can access the FollowMyHealth Patient Portal offered by [facility] by registering at the following website: [email or web address]. By joining [facility]’s FollowMyHealth portal, you will also be able to view your health information using other applications (apps) compatible with our system.

## 2022-09-01 VITALS
OXYGEN SATURATION: 97 % | SYSTOLIC BLOOD PRESSURE: 113 MMHG | RESPIRATION RATE: 18 BRPM | DIASTOLIC BLOOD PRESSURE: 68 MMHG | TEMPERATURE: 98 F | HEART RATE: 91 BPM

## 2022-09-01 NOTE — ED ADULT NURSE REASSESSMENT NOTE - NS ED NURSE REASSESS COMMENT FT1
Patient ambulated steadily to the bathroom. No distress/discomfort noted. No complaint voiced at this time. Safety maintained. Des

## 2023-03-25 ENCOUNTER — EMERGENCY (EMERGENCY)
Facility: HOSPITAL | Age: 28
LOS: 1 days | Discharge: HOME | End: 2023-03-25
Attending: EMERGENCY MEDICINE
Payer: COMMERCIAL

## 2023-03-25 VITALS
SYSTOLIC BLOOD PRESSURE: 137 MMHG | DIASTOLIC BLOOD PRESSURE: 83 MMHG | HEART RATE: 96 BPM | RESPIRATION RATE: 16 BRPM | TEMPERATURE: 98 F | OXYGEN SATURATION: 98 %

## 2023-03-25 DIAGNOSIS — R22.0 LOCALIZED SWELLING, MASS AND LUMP, HEAD: Chronic | ICD-10-CM

## 2023-03-25 PROCEDURE — 72100 X-RAY EXAM L-S SPINE 2/3 VWS: CPT

## 2023-03-25 PROCEDURE — 99284 EMERGENCY DEPT VISIT MOD MDM: CPT

## 2023-03-25 PROCEDURE — 72100 X-RAY EXAM L-S SPINE 2/3 VWS: CPT | Mod: 26

## 2023-03-25 PROCEDURE — 99283 EMERGENCY DEPT VISIT LOW MDM: CPT

## 2023-03-25 PROCEDURE — 99053 MED SERV 10PM-8AM 24 HR FAC: CPT

## 2023-03-25 RX ORDER — METHOCARBAMOL 500 MG/1
2 TABLET, FILM COATED ORAL
Qty: 30 | Refills: 0
Start: 2023-03-25 | End: 2023-03-29

## 2023-03-25 RX ORDER — IBUPROFEN 200 MG
1 TABLET ORAL
Qty: 20 | Refills: 0
Start: 2023-03-25 | End: 2023-03-29

## 2024-06-24 NOTE — ED ADULT NURSE NOTE - ALCOHOL PRE SCREEN (AUDIT - C)
Refill Decision Note   Francia Doan  is requesting a refill authorization.  Brief Assessment and Rationale for Refill:  Approve     Medication Therapy Plan:  FOVS in 1 month; Patient continually requests refills via portal instead of with pharmacy directly, may need additional education on requesting refills.      Comments:     Note composed:8:44 AM 06/24/2024           
English
Statement Selected

## 2024-10-23 NOTE — ED ADULT TRIAGE NOTE - TEMPERATURE IN FAHRENHEIT (DEGREES F)
Epidural Block    Start time: 10/23/2024 2:03 AM  Reason for block: procedure for pain  Staffing  Performed by: Barbara Humphrey MD  Authorized by: Barbara Humphrey MD    Preanesthetic Checklist  Completed: patient identified, IV checked, site marked, risks and benefits discussed, surgical consent, monitors and equipment checked, pre-op evaluation and timeout performed  Epidural  Patient position: sitting  Prep: ChloraPrep  Sedation Level: no sedation  Patient monitoring: frequent blood pressure checks, continuous pulse oximetry and heart rate  Approach: midline  Location: lumbar, L3-4  Injection technique: CHUY saline  Needle  Needle type: Tuohy   Needle gauge: 17 G  Needle insertion depth: 6 cm  Catheter type: multi-orifice  Catheter size: 20 G  Catheter at skin depth: 13 cm  Catheter securement method: stabilization device and clear occlusive dressing  Test dose: negativelidocaine-epinephrine (XYLOCAINE-MPF/EPINEPHRINE) 1.5 %-1:200,000 injection 3 mL - Epidural   3 mL - 10/23/2024 2:04:00 AM  Assessment  Sensory level: T10  Number of attempts: 1negative aspiration for CSF, negative aspiration for heme and no paresthesia on injection  patient tolerated the procedure well with no immediate complications           98